# Patient Record
Sex: MALE | Employment: OTHER | URBAN - METROPOLITAN AREA
[De-identification: names, ages, dates, MRNs, and addresses within clinical notes are randomized per-mention and may not be internally consistent; named-entity substitution may affect disease eponyms.]

---

## 2019-03-11 ENCOUNTER — TELEPHONE (OUTPATIENT)
Dept: GASTROENTEROLOGY | Facility: CLINIC | Age: 64
End: 2019-03-11

## 2019-03-13 DIAGNOSIS — K21.9 GASTROESOPHAGEAL REFLUX DISEASE WITHOUT ESOPHAGITIS: Primary | ICD-10-CM

## 2019-03-13 RX ORDER — PANTOPRAZOLE SODIUM 40 MG/1
40 TABLET, DELAYED RELEASE ORAL DAILY
Qty: 90 TABLET | Refills: 3 | Status: SHIPPED | OUTPATIENT
Start: 2019-03-13 | End: 2020-04-28

## 2020-04-28 DIAGNOSIS — K21.9 GASTROESOPHAGEAL REFLUX DISEASE WITHOUT ESOPHAGITIS: ICD-10-CM

## 2020-04-28 RX ORDER — PANTOPRAZOLE SODIUM 40 MG/1
TABLET, DELAYED RELEASE ORAL
Qty: 90 TABLET | Refills: 3 | Status: SHIPPED | OUTPATIENT
Start: 2020-04-28 | End: 2021-02-03

## 2020-07-09 ENCOUNTER — TELEPHONE (OUTPATIENT)
Dept: GASTROENTEROLOGY | Facility: CLINIC | Age: 65
End: 2020-07-09

## 2020-07-09 DIAGNOSIS — K21.9 GASTROESOPHAGEAL REFLUX DISEASE WITHOUT ESOPHAGITIS: ICD-10-CM

## 2021-01-28 DIAGNOSIS — K21.9 GASTROESOPHAGEAL REFLUX DISEASE WITHOUT ESOPHAGITIS: ICD-10-CM

## 2021-02-03 RX ORDER — PANTOPRAZOLE SODIUM 40 MG/1
TABLET, DELAYED RELEASE ORAL
Qty: 90 TABLET | Refills: 3 | Status: SHIPPED | OUTPATIENT
Start: 2021-02-03 | End: 2022-01-19

## 2021-04-07 ENCOUNTER — TELEPHONE (OUTPATIENT)
Dept: GASTROENTEROLOGY | Facility: CLINIC | Age: 66
End: 2021-04-07

## 2021-04-07 NOTE — TELEPHONE ENCOUNTER
Miriam Munoz patient - Patient stopped in to see about direct scheduling for a colonoscopy  Patient had been schedule in 12/19 and it was cancelled and then he never got another letter to reschedule it  Please call @ [phone] to see if direct scheduling is approprieat    Thxs

## 2021-04-09 ENCOUNTER — TELEPHONE (OUTPATIENT)
Dept: GASTROENTEROLOGY | Facility: CLINIC | Age: 66
End: 2021-04-09

## 2021-04-09 NOTE — TELEPHONE ENCOUNTER
04/09/21  Screened by: Dre Plasencia    Referring Provider dr Magdalene Peralta- Screening: There is no height or weight on file to calculate BMI  Has patient been referred for a routine screening Colonoscopy? yes  Is the patient between 39-70 years old? yes      Previous Colonoscopy no   If yes:    Date:     Facility:     Reason:       SCHEDULING STAFF: If the patient is between 45yrs-49yrs, please advise patient to confirm benefits/coverage with their insurance company for a routine screening colonoscopy, some insurance carriers will only cover at [address] or older  If the patient is over 66years old, please schedule an office visit  Does the patient want to see a Gastroenterologist prior to their procedure OR are they having any GI symptoms? no    Has the patient been hospitalized or had abdominal surgery in the past 6 months? no    Does the patient use supplemental oxygen? no    Does the patient take Coumadin, Lovenox, Plavix, Elliquis, Xarelto, or other blood thinning medication? no    Has the patient had a stroke, cardiac event, or stent placed in the past year? no    SCHEDULING STAFF: If patient answers NO to above questions, then schedule procedure  If patient answers YES to above questions, then schedule office appointment  If patient is between 45yrs - 49yrs, please advise patient that we will have to confirm benefits & coverage with their insurance company for a routine screening colonoscopy

## 2021-04-27 ENCOUNTER — TELEPHONE (OUTPATIENT)
Dept: GASTROENTEROLOGY | Facility: CLINIC | Age: 66
End: 2021-04-27

## 2022-01-18 DIAGNOSIS — K21.9 GASTROESOPHAGEAL REFLUX DISEASE WITHOUT ESOPHAGITIS: ICD-10-CM

## 2022-01-19 RX ORDER — PANTOPRAZOLE SODIUM 40 MG/1
TABLET, DELAYED RELEASE ORAL
Qty: 90 TABLET | Refills: 3 | Status: SHIPPED | OUTPATIENT
Start: 2022-01-19

## 2022-12-14 NOTE — TELEPHONE ENCOUNTER
Name: Adrianna Glass      : 1965      MRN: [id number]  Encounter Provider: Keihsa Rivas MD  Encounter Date: 2022   Encounter department: [address] [place] [address]     1  Encounter for wellness examination in adult    2  Essential hypertension  Assessment & Plan:  Lower blood pressures at home  Treatment and symptoms of orthostatic lightheadedness  Patient will reduce dose of amlodipine from 10 to 5 mg daily  Continue current dose of valsartan 160 mg once a day  He will continue monitoring blood pressures at home and will contact me if blood pressures are not well controlled  Orders:  -     amLODIPine (NORVASC) 5 mg tablet; Take 1 tablet (5 mg total) by mouth daily    3  Thrombocytopenia (HCC)  Assessment & Plan:  Stable mild thrombocytopenia  Patient was evaluated by hematology at [facility]  Observation advised  Platelet count has been fluctuating from 96,000-111,000, most recently 102K      4  Elevated serum creatinine  Assessment & Plan:  Patient is under care of nephrology at [facility]  No evidence of renal insufficiency  Cystatin C is normal       5  Elevated prostate specific antigen (PSA)  Assessment & Plan:  Patient is under care of [address] [place] [address] urology      Patient presents for annual well exam   He is up-to-date with health screenings  I recommend to proceed with flu vaccine  Patient should discontinue vitamin D supplements  He will be in touch regarding blood pressure readings at home  Subjective      Annual well exam   Patient has been feeling generally well  Results of recent blood work reviewed  He remains under ongoing care of nephrology at [facility]  Patient had recent evaluation at [facility] due to finding of chronic thrombocytopenia, observation advised  Patient follows a very healthy diet      Weight loss 10 lb since last  OV   Patient has been monitoring B/P at home, lmomtcb 115/72  He reports intermittent symptoms of orthostatic lightheadedness and wonders if dose of amlodipine could be reduced  Last labs  By nephrology   10/2022  CYSTATIN C 0 52 - 1 23 mg/L 1 03   EGFR Result: >=60 mL/min/1 73m2 76     Creatinine 0 70 - 1 30 mg/dL 1 29  10/1/22     Sleeps well   Will back OFF vit D supplements as blood work indicates elevated level of vitamin D, improved on recheck   Works out 6 days     UTD with colonoscopy  2 covid vaccines, had a reaction, no known h/o COVID  Patient has not had flu vaccine this fall, I strongly advised him to consider, he is reluctant to proceed today  Sees Dr Maradiaga for BPH and elevated PSA  On Cialis daily  Symptoms overall have been well controled  Patient remains under ongoing care [facility] urology as well  Review of Systems   Constitutional: Negative  HENT: Negative  Eyes: Negative  Respiratory: Negative  Cardiovascular: Negative  Gastrointestinal: Negative  Endocrine: Negative  Genitourinary: Negative  Musculoskeletal: Negative  Skin: Negative  Allergic/Immunologic: Negative  Neurological: Negative  Hematological: Negative  Psychiatric/Behavioral: Negative          Past Medical History:   Diagnosis Date   • Benign prostatic hyperplasia    • Herpes simplex     RESOLVED [id number]   • Hypertension    • Renal disorder      Past Surgical History:   Procedure Laterality Date   • COLONOSCOPY  12/23/2015     [facility]   • PROSTATE BIOPSY       Family History   Problem Relation Age of Onset   • Other Mother         CABG   • Diabetes Mother    • Hypertension Mother    • Hypertension Father    • Kidney cancer Family    • Prostate cancer Family    • Hypertension Brother    • Hypertension Sister    • Celiac disease Brother    • No Known Problems Son    • Eczema Daughter      Social History     Socioeconomic History   • Marital status: /Civil Union     Spouse name: Stephen Wylie    • Number of children: 2   • Years of education: None   • Highest education level: None   Occupational History   • Occupation: /[employer]   Tobacco Use   • Smoking status: Never   • Smokeless tobacco: Never   Vaping Use   • Vaping Use: Never used   Substance and Sexual Activity   • Alcohol use: No   • Drug use: No   • Sexual activity: None   Other Topics Concern   • None   Social History Narrative   • None     Social Determinants of Health     Financial Resource Strain: Not on file   Food Insecurity: Not on file   Transportation Needs: Not on file   Physical Activity: Not on file   Stress: Not on file   Social Connections: Not on file   Intimate Partner Violence: Not on file   Housing Stability: Not on file     Current Outpatient Medications on File Prior to Visit   Medication Sig   • Ascorbic Acid (EMILY-C PO) Take by mouth   • Cholecalciferol (VITAMIN D3) 2000 units TABS Take 1 tablet by mouth daily     • loratadine (CLARITIN) 10 mg tablet Take by mouth   • Misc Natural Products (TURMERIC CURCUMIN) CAPS Take 1 capsule by mouth 2 (two) times a day 2400mg twice daily    • Multiple Vitamins-Minerals (CENTRUM SILVER 50+MEN PO) Take by mouth   • tadalafil (CIALIS) 5 MG tablet TAKE ONE TABLET BY MOUTH EVERY DAY   • valsartan (Diovan) 160 mg tablet Take 1 tablet (160 mg total) by mouth daily   • Zinc Sulfate (ZINC 15 PO) Take by mouth     Allergies   Allergen Reactions   • Moxifloxacin Itching and Edema     Category:  Allergy;    • Lisinopril Rash   • Valsartan Rash     Immunization History   Administered Date(s) Administered   • COVID-19 MODERNA VACC 0 5 ML IM 03/17/2021, 04/14/2021   • Influenza Quadrivalent Preservative Free 3 years and older IM 10/20/2017   • Influenza, seasonal, injectable 08/20/2016       Objective     /82 (BP Location: Left arm, Patient Position: Sitting, Cuff Size: Large)   Pulse 59   Temp 98 °F (36 7 °C) (Temporal)   Resp 16   Ht 5' 7" (1 702 m)   Wt 90 4 kg (199 lb 3 2 oz)   SpO2 100% BMI 31 20 kg/m²     Physical Exam  Vitals and nursing note reviewed  Constitutional:       General: He is not in acute distress  Appearance: Normal appearance  He is well-developed  He is not ill-appearing  HENT:      Head: Normocephalic and atraumatic  Eyes:      General: No scleral icterus  Conjunctiva/sclera: Conjunctivae normal    Neck:      Vascular: No carotid bruit  Cardiovascular:      Rate and Rhythm: Normal rate and regular rhythm  Heart sounds: Normal heart sounds  No murmur heard  Pulmonary:      Effort: Pulmonary effort is normal  No respiratory distress  Breath sounds: Normal breath sounds  No wheezing or rales  Abdominal:      General: Bowel sounds are normal  There is no distension or abdominal bruit  Palpations: Abdomen is soft  Musculoskeletal:         General: Normal range of motion  Cervical back: Neck supple  No rigidity  Right lower leg: No edema  Left lower leg: No edema  Skin:     General: Skin is warm  Neurological:      General: No focal deficit present  Mental Status: He is alert and oriented to person, place, and time  Cranial Nerves: No cranial nerve deficit  Psychiatric:         Mood and Affect: Mood normal          Behavior: Behavior normal          Thought Content:  Thought content normal        Roberto Teague MD

## 2022-12-21 DIAGNOSIS — K21.9 GASTROESOPHAGEAL REFLUX DISEASE WITHOUT ESOPHAGITIS: ICD-10-CM

## 2022-12-22 NOTE — TELEPHONE ENCOUNTER
LMOM for patient to phone back to schedule an appt for fup and  medication review with Dr Desire Gutierrez or a RADHA Juarez

## 2022-12-27 RX ORDER — PANTOPRAZOLE SODIUM 40 MG/1
TABLET, DELAYED RELEASE ORAL
Qty: 90 TABLET | Refills: 3 | Status: SHIPPED | OUTPATIENT
Start: 2022-12-27

## 2023-02-09 ENCOUNTER — OFFICE VISIT (OUTPATIENT)
Dept: GASTROENTEROLOGY | Facility: CLINIC | Age: 68
End: 2023-02-09

## 2023-02-09 VITALS
DIASTOLIC BLOOD PRESSURE: 74 MMHG | BODY MASS INDEX: 35.22 KG/M2 | WEIGHT: 237.8 LBS | HEART RATE: 80 BPM | OXYGEN SATURATION: 98 % | SYSTOLIC BLOOD PRESSURE: 122 MMHG | HEIGHT: 69 IN

## 2023-02-09 DIAGNOSIS — Z12.11 COLON CANCER SCREENING: Primary | ICD-10-CM

## 2023-02-09 DIAGNOSIS — Z86.010 PERSONAL HISTORY OF COLONIC POLYPS: ICD-10-CM

## 2023-02-09 RX ORDER — AMLODIPINE BESYLATE 5 MG/1
1 TABLET ORAL DAILY
COMMUNITY
Start: 2022-12-30

## 2023-02-09 RX ORDER — SIMVASTATIN 40 MG
TABLET ORAL
COMMUNITY
Start: 2023-01-28

## 2023-02-09 RX ORDER — CARBOXYMETHYLCELLULOSE SODIUM 10 MG/ML
1 GEL OPHTHALMIC 3 TIMES DAILY
COMMUNITY
End: 2023-02-09

## 2023-02-09 RX ORDER — SIMVASTATIN 10 MG
10 TABLET ORAL
COMMUNITY
End: 2023-02-09

## 2023-02-09 RX ORDER — LANOLIN ALCOHOL/MO/W.PET/CERES
325 CREAM (GRAM) TOPICAL
COMMUNITY
End: 2023-02-09

## 2023-02-09 RX ORDER — FERROUS SULFATE 325(65) MG
325 TABLET ORAL
COMMUNITY
End: 2023-02-09

## 2023-02-09 RX ORDER — CARVEDILOL PHOSPHATE 40 MG/1
CAPSULE, EXTENDED RELEASE ORAL
COMMUNITY
Start: 2022-12-16

## 2023-02-09 RX ORDER — INSULIN GLARGINE 300 U/ML
INJECTION, SOLUTION SUBCUTANEOUS
COMMUNITY
Start: 2023-01-18

## 2023-02-09 RX ORDER — HYDROCORTISONE 10 MG/1
TABLET ORAL
COMMUNITY
Start: 2023-01-09 | End: 2023-02-09

## 2023-02-09 RX ORDER — HYDROCORTISONE 10 MG/1
TABLET ORAL
COMMUNITY
Start: 2023-01-09

## 2023-02-09 RX ORDER — AMLODIPINE BESYLATE 5 MG/1
TABLET ORAL
COMMUNITY
Start: 2023-01-28 | End: 2023-02-09

## 2023-02-09 RX ORDER — URSODIOL 300 MG/1
1 CAPSULE ORAL 2 TIMES DAILY
COMMUNITY
Start: 2022-12-22

## 2023-02-09 RX ORDER — SPIRONOLACTONE 25 MG/1
TABLET ORAL
COMMUNITY
Start: 2022-12-30 | End: 2023-02-09

## 2023-02-09 RX ORDER — CARVEDILOL PHOSPHATE 40 MG/1
1 CAPSULE, EXTENDED RELEASE ORAL DAILY
COMMUNITY
Start: 2022-11-17 | End: 2023-02-09

## 2023-02-09 RX ORDER — SEMAGLUTIDE 1.34 MG/ML
INJECTION, SOLUTION SUBCUTANEOUS
COMMUNITY
Start: 2022-11-30

## 2023-02-09 RX ORDER — LEVOTHYROXINE SODIUM 0.05 MG/1
50 TABLET ORAL
COMMUNITY

## 2023-02-09 RX ORDER — VIT C/B6/B5/MAGNESIUM/HERB 173 50-5-6-5MG
CAPSULE ORAL
COMMUNITY

## 2023-02-09 RX ORDER — URSODIOL 300 MG/1
CAPSULE ORAL
COMMUNITY
Start: 2023-01-18 | End: 2023-02-09

## 2023-02-09 RX ORDER — PREGABALIN 75 MG/1
CAPSULE ORAL
COMMUNITY
Start: 2022-12-08 | End: 2023-02-09

## 2023-02-09 RX ORDER — BLOOD SUGAR DIAGNOSTIC
STRIP MISCELLANEOUS
COMMUNITY
Start: 2022-12-04

## 2023-02-09 RX ORDER — SIMVASTATIN 40 MG
1 TABLET ORAL EVERY EVENING
COMMUNITY
Start: 2022-02-28 | End: 2023-02-09

## 2023-02-09 RX ORDER — PANTOPRAZOLE SODIUM 40 MG/1
40 TABLET, DELAYED RELEASE ORAL
COMMUNITY
End: 2023-02-09

## 2023-02-09 RX ORDER — SPIRONOLACTONE 25 MG/1
1 TABLET ORAL DAILY
COMMUNITY
Start: 2022-09-12

## 2023-02-09 RX ORDER — CYCLOSPORINE 0.5 MG/ML
1 EMULSION OPHTHALMIC 2 TIMES DAILY
COMMUNITY

## 2023-02-09 NOTE — PROGRESS NOTES
[facility] - Outpatient Consultation  Radha Farris 79 y o  male MRN: [id number]  Encounter: [id number]          ASSESSMENT AND PLAN:      1  Colon cancer screening  2  Personal history of colonic polyps  - Schedule colonoscopy with miralax prep for colon cancer screening; last colonoscopy was 7-8 years ago and he has a history of polyps  - No alarm symptoms reported    ______________________________________________________________________    HPI:  Ilda Myrick is a 78 yo M with a PMH of DM2, HTN, hypothyroidism, HLD, presenting to schedule his routine colonoscopy  He reports his last colonoscopy was 7 or 8 years ago and he did have polyps removed  He reports that he was due in 2020 for his colonoscopy but this was delayed due to the pandemic  He denies any problems such as abdominal pain, changes in bowel habits, or blood in the stool  There is no known family history of colon cancer  He denies any underlying cardiac or pulmonary disease  He has done well with anesthesia in the past       REVIEW OF SYSTEMS:    CONSTITUTIONAL: Denies any fever, chills, rigors, and weight loss  HEENT: No earache or tinnitus  Denies hearing loss or visual disturbances  CARDIOVASCULAR: No chest pain or palpitations  RESPIRATORY: Denies any cough, hemoptysis, shortness of breath or dyspnea on exertion  GASTROINTESTINAL: As noted in the History of Present Illness  GENITOURINARY: No problems with urination  Denies any hematuria or dysuria  NEUROLOGIC: No dizziness or vertigo, denies headaches  MUSCULOSKELETAL: Denies any muscle or joint pain  SKIN: Denies skin rashes or itching  ENDOCRINE: Denies excessive thirst  Denies intolerance to heat or cold  PSYCHOSOCIAL: Denies depression or anxiety  Denies any recent memory loss  Historical Information   History reviewed  No pertinent past medical history    Past Surgical History:   Procedure Laterality Date   • HIP SURGERY Right 2022     Social History Social History     Substance and Sexual Activity   Alcohol Use Never     Social History     Substance and Sexual Activity   Drug Use Never     Social History     Tobacco Use   Smoking Status Never   Smokeless Tobacco Never     History reviewed  No pertinent family history  Meds/Allergies       Current Outpatient Medications:   •  Accu-Chek Guide test strip  •  amLODIPine (NORVASC) 5 mg tablet  •  ASPIRIN 81 PO  •  carvedilol (COREG CR) 40 MG 24 hr capsule  •  Cholecalciferol 50 MCG (2000 UT) TABS  •  cycloSPORINE (RESTASIS) 0 05 % ophthalmic emulsion  •  glucose blood test strip  •  glucose-vitamin C 4-0 006 g  •  hydrocortisone (CORTEF) 10 mg tablet  •  levothyroxine 50 mcg tablet  •  Ozempic, 0 25 or 0 5 MG/DOSE, 2 MG/1 5ML injection pen  •  pantoprazole (PROTONIX) 40 mg tablet  •  simvastatin (ZOCOR) 40 mg tablet  •  spironolactone (ALDACTONE) 25 mg tablet  •  Toujeo Max SoloStar 300 units/mL CONCENTRATED U-300 injection pen (2-unit dial)  •  Turmeric 500 MG CAPS  •  ursodiol (ACTIGALL) 300 mg capsule    No Known Allergies        Objective     Blood pressure 122/74, pulse 80, height 5' 9" (1 753 m), weight 108 kg (237 lb 12 8 oz), SpO2 98 %  Body mass index is 35 12 kg/m²  PHYSICAL EXAM:      General Appearance:   Alert, cooperative, no distress   HEENT:   Normocephalic, atraumatic, anicteric      Neck:  Supple, symmetrical, trachea midline   Lungs:   Clear to auscultation bilaterally; no rales, rhonchi or wheezing; respirations unlabored    Heart[de-identified]   Regular rate and rhythm; no murmur, rub, or gallop  Abdomen:   Soft, non-tender, non-distended; normal bowel sounds; no masses, no organomegaly    Genitalia:   Deferred    Rectal:   Deferred    Extremities:  No cyanosis, clubbing or edema    Pulses:  2+ and symmetric    Skin:  No jaundice, rashes, or lesions    Lymph nodes:  No palpable cervical lymphadenopathy        Lab Results:   No visits with results within 1 Day(s) from this visit     Latest known visit with results is:   No results found for any previous visit  Radiology Results:   No results found

## 2023-02-09 NOTE — PATIENT INSTRUCTIONS
Scheduled date of colonoscopy (as of today):5/10/23  Physician performing colonoscopy: Rodrigue  Location of colonoscopy:[place]  Bowel prep reviewed with patient:miralax/dulcolax  Instructions reviewed with patient by:Feli RUEDA  Clearances:  none

## 2023-05-09 RX ORDER — SODIUM CHLORIDE, SODIUM LACTATE, POTASSIUM CHLORIDE, CALCIUM CHLORIDE 600; 310; 30; 20 MG/100ML; MG/100ML; MG/100ML; MG/100ML
100 INJECTION, SOLUTION INTRAVENOUS CONTINUOUS
Status: CANCELLED | OUTPATIENT
Start: 2023-05-09

## 2023-05-10 ENCOUNTER — ANESTHESIA EVENT (OUTPATIENT)
Dept: GASTROENTEROLOGY | Facility: HOSPITAL | Age: 68
End: 2023-05-10

## 2023-05-10 ENCOUNTER — ANESTHESIA (OUTPATIENT)
Dept: GASTROENTEROLOGY | Facility: HOSPITAL | Age: 68
End: 2023-05-10

## 2023-05-10 ENCOUNTER — HOSPITAL ENCOUNTER (OUTPATIENT)
Dept: GASTROENTEROLOGY | Facility: HOSPITAL | Age: 68
Setting detail: OUTPATIENT SURGERY
Discharge: HOME | End: 2023-05-10
Admitting: INTERNAL MEDICINE

## 2023-05-10 VITALS
HEART RATE: 55 BPM | RESPIRATION RATE: 16 BRPM | TEMPERATURE: 97.8 F | SYSTOLIC BLOOD PRESSURE: 111 MMHG | WEIGHT: 231.7 LBS | BODY MASS INDEX: 35.12 KG/M2 | DIASTOLIC BLOOD PRESSURE: 66 MMHG | HEIGHT: 68 IN | OXYGEN SATURATION: 95 %

## 2023-05-10 DIAGNOSIS — Z12.11 COLON CANCER SCREENING: ICD-10-CM

## 2023-05-10 DIAGNOSIS — Z86.010 PERSONAL HISTORY OF COLONIC POLYPS: ICD-10-CM

## 2023-05-10 LAB — GLUCOSE SERPL-MCNC: 98 MG/DL (ref 65–140)

## 2023-05-10 RX ORDER — SODIUM CHLORIDE, SODIUM LACTATE, POTASSIUM CHLORIDE, CALCIUM CHLORIDE 600; 310; 30; 20 MG/100ML; MG/100ML; MG/100ML; MG/100ML
100 INJECTION, SOLUTION INTRAVENOUS CONTINUOUS
Status: DISCONTINUED | OUTPATIENT
Start: 2023-05-10 | End: 2023-05-14 | Stop reason: HOSPADM

## 2023-05-10 RX ORDER — PROPOFOL 10 MG/ML
INJECTION, EMULSION INTRAVENOUS AS NEEDED
Status: DISCONTINUED | OUTPATIENT
Start: 2023-05-10 | End: 2023-05-10

## 2023-05-10 RX ADMIN — PROPOFOL 30 MG: 10 INJECTION, EMULSION INTRAVENOUS at 10:34

## 2023-05-10 RX ADMIN — PROPOFOL 40 MG: 10 INJECTION, EMULSION INTRAVENOUS at 10:36

## 2023-05-10 RX ADMIN — SODIUM CHLORIDE, SODIUM LACTATE, POTASSIUM CHLORIDE, AND CALCIUM CHLORIDE 100 ML/HR: .6; .31; .03; .02 INJECTION, SOLUTION INTRAVENOUS at 09:43

## 2023-05-10 RX ADMIN — PROPOFOL 100 MG: 10 INJECTION, EMULSION INTRAVENOUS at 10:31

## 2023-05-10 RX ADMIN — PROPOFOL 30 MG: 10 INJECTION, EMULSION INTRAVENOUS at 10:38

## 2023-05-10 RX ADMIN — PROPOFOL 30 MG: 10 INJECTION, EMULSION INTRAVENOUS at 10:32

## 2023-05-10 NOTE — ANESTHESIA PREPROCEDURE EVALUATION
Procedure:  COLONOSCOPY    Relevant Problems   ANESTHESIA (within normal limits)      CARDIO   (+) Hyperlipidemia   (+) Hypertension      ENDO  pituitary adenoma; on chronic hydrocortisone, thyroid meds   (+) Hypothyroidism      PULMONARY   (+) Sleep apnea   (-) Smoking   (-) URI (upper respiratory infection)      Endocrine   (+) Diabetes mellitus (HCC)      Other   (+) CPAP (continuous positive airway pressure) dependence    BMI 35    Physical Exam    Airway    Mallampati score: II  TM Distance: >3 FB  Neck ROM: full     Dental   No notable dental hx     Cardiovascular      Pulmonary      Other Findings         Lab Results   Component Value Date    HGBA1C 7 3 (H) 04/25/2023         Anesthesia Plan  ASA Score- 3     Anesthesia Type- IV sedation with anesthesia with ASA Monitors  Additional Monitors:   Airway Plan:           Plan Factors-Exercise tolerance (METS): >4 METS  Chart reviewed  Existing labs reviewed  Patient summary reviewed  Patient is not a current smoker  Obstructive sleep apnea risk education given perioperatively  Induction- intravenous  Postoperative Plan-     Informed Consent- Anesthetic plan and risks discussed with patient  I personally reviewed this patient with the CRNA  Discussed and agreed on the Anesthesia Plan with the CRNA  Deepthi Hoyt

## 2023-05-10 NOTE — H&P
"History and Physical -  [facility]  Stephan Martínez 79 y o  male MRN: [id number]                  HPI: Stephan Martínez is a 79y o  year old male who presents for colonoscopy for history of colon polyps      REVIEW OF SYSTEMS: Per the HPI, and otherwise unremarkable  Historical Information   Past Medical History:   Diagnosis Date   • Colon polyp    • CPAP (continuous positive airway pressure) dependence    • Diabetes mellitus ([address] )    • Disease of thyroid gland    • Hyperlipidemia    • Hypertension    • Sleep apnea      Past Surgical History:   Procedure Laterality Date   • BACK SURGERY     • COLONOSCOPY     • HERNIA REPAIR     • HIP SURGERY Right 2022     Social History   Social History     Substance and Sexual Activity   Alcohol Use Never     Social History     Substance and Sexual Activity   Drug Use Never     Social History     Tobacco Use   Smoking Status Never   Smokeless Tobacco Never     History reviewed  No pertinent family history  Meds/Allergies     (Not in a hospital admission)      No Known Allergies    Objective     Blood pressure 105/68, pulse 68, temperature (!) 97 1 °F (36 2 °C), temperature source Temporal, resp  rate 16, height 5' 8\" (1 727 m), weight 105 kg (231 lb 11 3 oz), SpO2 97 %  PHYSICAL EXAM    /68   Pulse 68   Temp (!) 97 1 °F (36 2 °C) (Temporal)   Resp 16   Ht 5' 8\" (1 727 m)   Wt 105 kg (231 lb 11 3 oz)   SpO2 97%   BMI 35 23 kg/m²       Gen: NAD  CV: RRR  CHEST: Clear  ABD: soft, NT/ND  EXT: no edema      ASSESSMENT/PLAN:  This is a 79y o  year old male here for colonoscopy, and he is stable and optimized for his procedure        "

## 2023-05-10 NOTE — ANESTHESIA POSTPROCEDURE EVALUATION
Post-Op Assessment Note    CV Status:  Stable    Pain management: adequate     Mental Status:  Sleepy   Hydration Status:  Euvolemic   PONV Controlled:  Controlled   Airway Patency:  Patent   Two or more mitigation strategies used for obstructive sleep apnea   Post Op Vitals Reviewed: Yes      Staff: CRNA         No notable events documented      BP   95/53   Temp  97 3   Pulse  65   Resp   17   SpO2   93

## 2023-05-11 ENCOUNTER — TELEPHONE (OUTPATIENT)
Dept: OTHER | Facility: OTHER | Age: 68
End: 2023-05-11

## 2023-05-11 NOTE — TELEPHONE ENCOUNTER
Pt had a colonoscopy done yesterday and he received a call from the office today  He was just returning the call

## 2023-05-15 ENCOUNTER — TELEPHONE (OUTPATIENT)
Dept: GASTROENTEROLOGY | Facility: CLINIC | Age: 68
End: 2023-05-15

## 2023-05-15 NOTE — TELEPHONE ENCOUNTER
----- Message from Jonas Stone MD sent at 5/15/2023  7:52 AM EDT -----  Please tell him that the polyps were precancerous and to have a colonoscopy in 3 years

## 2023-11-26 DIAGNOSIS — K21.9 GASTROESOPHAGEAL REFLUX DISEASE WITHOUT ESOPHAGITIS: ICD-10-CM

## 2023-11-27 RX ORDER — PANTOPRAZOLE SODIUM 40 MG/1
TABLET, DELAYED RELEASE ORAL
Qty: 90 TABLET | Refills: 1 | Status: SHIPPED | OUTPATIENT
Start: 2023-11-27

## 2024-04-30 DIAGNOSIS — K21.9 GASTROESOPHAGEAL REFLUX DISEASE WITHOUT ESOPHAGITIS: ICD-10-CM

## 2024-05-01 RX ORDER — PANTOPRAZOLE SODIUM 40 MG/1
TABLET, DELAYED RELEASE ORAL
Qty: 90 TABLET | Refills: 1 | Status: SHIPPED | OUTPATIENT
Start: 2024-05-01

## 2024-10-02 DIAGNOSIS — K21.9 GASTROESOPHAGEAL REFLUX DISEASE WITHOUT ESOPHAGITIS: ICD-10-CM

## 2024-10-02 RX ORDER — PANTOPRAZOLE SODIUM 40 MG/1
TABLET, DELAYED RELEASE ORAL
Qty: 90 TABLET | Refills: 1 | Status: SHIPPED | OUTPATIENT
Start: 2024-10-02

## 2025-02-12 DIAGNOSIS — K21.9 GASTROESOPHAGEAL REFLUX DISEASE WITHOUT ESOPHAGITIS: ICD-10-CM

## 2025-02-12 RX ORDER — PANTOPRAZOLE SODIUM 40 MG/1
40 TABLET, DELAYED RELEASE ORAL DAILY
Qty: 90 TABLET | Refills: 3 | Status: SHIPPED | OUTPATIENT
Start: 2025-02-12